# Patient Record
Sex: FEMALE | Race: WHITE | Employment: UNEMPLOYED | URBAN - METROPOLITAN AREA
[De-identification: names, ages, dates, MRNs, and addresses within clinical notes are randomized per-mention and may not be internally consistent; named-entity substitution may affect disease eponyms.]

---

## 2020-09-02 ENCOUNTER — ANESTHESIA EVENT (OUTPATIENT)
Dept: SURGERY | Age: 60
End: 2020-09-02
Payer: COMMERCIAL

## 2020-09-03 ENCOUNTER — HOSPITAL ENCOUNTER (OUTPATIENT)
Age: 60
Setting detail: OUTPATIENT SURGERY
Discharge: HOME | End: 2020-09-03
Attending: ORTHOPAEDIC SURGERY | Admitting: ORTHOPAEDIC SURGERY
Payer: COMMERCIAL

## 2020-09-03 ENCOUNTER — ANESTHESIA (OUTPATIENT)
Dept: SURGERY | Age: 60
End: 2020-09-03
Payer: COMMERCIAL

## 2020-09-03 VITALS
SYSTOLIC BLOOD PRESSURE: 116 MMHG | HEIGHT: 70 IN | BODY MASS INDEX: 23.62 KG/M2 | HEART RATE: 80 BPM | OXYGEN SATURATION: 93 % | WEIGHT: 165 LBS | RESPIRATION RATE: 16 BRPM | DIASTOLIC BLOOD PRESSURE: 63 MMHG | TEMPERATURE: 97.3 F

## 2020-09-03 DIAGNOSIS — L76.82 PAIN AT SURGICAL INCISION: Primary | ICD-10-CM

## 2020-09-03 PROCEDURE — 74011250636 HC RX REV CODE- 250/636: Performed by: ANESTHESIOLOGY

## 2020-09-03 PROCEDURE — 76060000032 HC ANESTHESIA 0.5 TO 1 HR: Performed by: ORTHOPAEDIC SURGERY

## 2020-09-03 PROCEDURE — 77030040361 HC SLV COMPR DVT MDII -B: Performed by: ORTHOPAEDIC SURGERY

## 2020-09-03 PROCEDURE — 74011250637 HC RX REV CODE- 250/637: Performed by: ANESTHESIOLOGY

## 2020-09-03 PROCEDURE — 76210000020 HC REC RM PH II FIRST 0.5 HR: Performed by: ORTHOPAEDIC SURGERY

## 2020-09-03 PROCEDURE — 77030003666 HC NDL SPINAL BD -A: Performed by: ORTHOPAEDIC SURGERY

## 2020-09-03 PROCEDURE — A4565 SLINGS: HCPCS | Performed by: ORTHOPAEDIC SURGERY

## 2020-09-03 PROCEDURE — 77030002916 HC SUT ETHLN J&J -A: Performed by: ORTHOPAEDIC SURGERY

## 2020-09-03 PROCEDURE — 76010010054 HC POST OP PAIN BLOCK: Performed by: ORTHOPAEDIC SURGERY

## 2020-09-03 PROCEDURE — 77030017964 HC PRB COAG4 STRY -C: Performed by: ORTHOPAEDIC SURGERY

## 2020-09-03 PROCEDURE — 74011250636 HC RX REV CODE- 250/636: Performed by: ORTHOPAEDIC SURGERY

## 2020-09-03 PROCEDURE — 77030019605: Performed by: ORTHOPAEDIC SURGERY

## 2020-09-03 PROCEDURE — 77030033005 HC TBNG ARTHSC PMP STRY -B: Performed by: ORTHOPAEDIC SURGERY

## 2020-09-03 PROCEDURE — 76942 ECHO GUIDE FOR BIOPSY: CPT | Performed by: ORTHOPAEDIC SURGERY

## 2020-09-03 PROCEDURE — 76010000160 HC OR TIME 0.5 TO 1 HR INTENSV-TIER 1: Performed by: ORTHOPAEDIC SURGERY

## 2020-09-03 PROCEDURE — 74011250636 HC RX REV CODE- 250/636: Performed by: NURSE ANESTHETIST, CERTIFIED REGISTERED

## 2020-09-03 PROCEDURE — 77030010509 HC AIRWY LMA MSK TELE -A: Performed by: ANESTHESIOLOGY

## 2020-09-03 PROCEDURE — 76210000006 HC OR PH I REC 0.5 TO 1 HR: Performed by: ORTHOPAEDIC SURGERY

## 2020-09-03 PROCEDURE — 77030006891 HC BLD SHV RESECT STRY -B: Performed by: ORTHOPAEDIC SURGERY

## 2020-09-03 PROCEDURE — 77030003602 HC NDL NRV BLK BBMI -B: Performed by: ANESTHESIOLOGY

## 2020-09-03 PROCEDURE — 74011000250 HC RX REV CODE- 250: Performed by: NURSE ANESTHETIST, CERTIFIED REGISTERED

## 2020-09-03 PROCEDURE — 77030006868 HC BLD SHV AUG STRY -B: Performed by: ORTHOPAEDIC SURGERY

## 2020-09-03 RX ORDER — ACETAMINOPHEN 500 MG
2000 TABLET ORAL 2 TIMES DAILY
COMMUNITY

## 2020-09-03 RX ORDER — HYDROMORPHONE HYDROCHLORIDE 2 MG/ML
0.5 INJECTION, SOLUTION INTRAMUSCULAR; INTRAVENOUS; SUBCUTANEOUS
Status: DISCONTINUED | OUTPATIENT
Start: 2020-09-03 | End: 2020-09-03 | Stop reason: HOSPADM

## 2020-09-03 RX ORDER — OXYCODONE AND ACETAMINOPHEN 10; 325 MG/1; MG/1
1 TABLET ORAL AS NEEDED
Status: DISCONTINUED | OUTPATIENT
Start: 2020-09-03 | End: 2020-09-03 | Stop reason: HOSPADM

## 2020-09-03 RX ORDER — DEXAMETHASONE SODIUM PHOSPHATE 4 MG/ML
INJECTION, SOLUTION INTRA-ARTICULAR; INTRALESIONAL; INTRAMUSCULAR; INTRAVENOUS; SOFT TISSUE AS NEEDED
Status: DISCONTINUED | OUTPATIENT
Start: 2020-09-03 | End: 2020-09-03 | Stop reason: HOSPADM

## 2020-09-03 RX ORDER — SODIUM CHLORIDE, SODIUM LACTATE, POTASSIUM CHLORIDE, CALCIUM CHLORIDE 600; 310; 30; 20 MG/100ML; MG/100ML; MG/100ML; MG/100ML
75 INJECTION, SOLUTION INTRAVENOUS CONTINUOUS
Status: DISCONTINUED | OUTPATIENT
Start: 2020-09-03 | End: 2020-09-03 | Stop reason: HOSPADM

## 2020-09-03 RX ORDER — LANOLIN ALCOHOL/MO/W.PET/CERES
1000 CREAM (GRAM) TOPICAL DAILY
COMMUNITY

## 2020-09-03 RX ORDER — FENTANYL CITRATE 50 UG/ML
100 INJECTION, SOLUTION INTRAMUSCULAR; INTRAVENOUS ONCE
Status: COMPLETED | OUTPATIENT
Start: 2020-09-03 | End: 2020-09-03

## 2020-09-03 RX ORDER — SODIUM CHLORIDE 0.9 % (FLUSH) 0.9 %
5-40 SYRINGE (ML) INJECTION EVERY 8 HOURS
Status: DISCONTINUED | OUTPATIENT
Start: 2020-09-03 | End: 2020-09-03 | Stop reason: HOSPADM

## 2020-09-03 RX ORDER — DEXAMETHASONE SODIUM PHOSPHATE 4 MG/ML
INJECTION, SOLUTION INTRA-ARTICULAR; INTRALESIONAL; INTRAMUSCULAR; INTRAVENOUS; SOFT TISSUE
Status: COMPLETED | OUTPATIENT
Start: 2020-09-03 | End: 2020-09-03

## 2020-09-03 RX ORDER — LIDOCAINE HYDROCHLORIDE 20 MG/ML
INJECTION, SOLUTION EPIDURAL; INFILTRATION; INTRACAUDAL; PERINEURAL AS NEEDED
Status: DISCONTINUED | OUTPATIENT
Start: 2020-09-03 | End: 2020-09-03 | Stop reason: HOSPADM

## 2020-09-03 RX ORDER — OXYCODONE HYDROCHLORIDE 5 MG/1
5 TABLET ORAL
Status: DISCONTINUED | OUTPATIENT
Start: 2020-09-03 | End: 2020-09-03 | Stop reason: HOSPADM

## 2020-09-03 RX ORDER — MIDAZOLAM HYDROCHLORIDE 1 MG/ML
2 INJECTION, SOLUTION INTRAMUSCULAR; INTRAVENOUS
Status: COMPLETED | OUTPATIENT
Start: 2020-09-03 | End: 2020-09-03

## 2020-09-03 RX ORDER — PROPOFOL 10 MG/ML
INJECTION, EMULSION INTRAVENOUS AS NEEDED
Status: DISCONTINUED | OUTPATIENT
Start: 2020-09-03 | End: 2020-09-03 | Stop reason: HOSPADM

## 2020-09-03 RX ORDER — ONDANSETRON 2 MG/ML
INJECTION INTRAMUSCULAR; INTRAVENOUS AS NEEDED
Status: DISCONTINUED | OUTPATIENT
Start: 2020-09-03 | End: 2020-09-03 | Stop reason: HOSPADM

## 2020-09-03 RX ORDER — EPINEPHRINE 1 MG/ML
INJECTION, SOLUTION, CONCENTRATE INTRAVENOUS AS NEEDED
Status: DISCONTINUED | OUTPATIENT
Start: 2020-09-03 | End: 2020-09-03 | Stop reason: HOSPADM

## 2020-09-03 RX ORDER — CEFAZOLIN SODIUM/WATER 2 G/20 ML
2 SYRINGE (ML) INTRAVENOUS ONCE
Status: COMPLETED | OUTPATIENT
Start: 2020-09-03 | End: 2020-09-03

## 2020-09-03 RX ORDER — ACETAMINOPHEN 500 MG
1000 TABLET ORAL ONCE
Status: COMPLETED | OUTPATIENT
Start: 2020-09-03 | End: 2020-09-03

## 2020-09-03 RX ORDER — EPHEDRINE SULFATE/0.9% NACL/PF 50 MG/5 ML
SYRINGE (ML) INTRAVENOUS AS NEEDED
Status: DISCONTINUED | OUTPATIENT
Start: 2020-09-03 | End: 2020-09-03 | Stop reason: HOSPADM

## 2020-09-03 RX ORDER — ROPIVACAINE HYDROCHLORIDE 5 MG/ML
INJECTION, SOLUTION EPIDURAL; INFILTRATION; PERINEURAL
Status: COMPLETED | OUTPATIENT
Start: 2020-09-03 | End: 2020-09-03

## 2020-09-03 RX ORDER — OXYCODONE AND ACETAMINOPHEN 5; 325 MG/1; MG/1
1 TABLET ORAL
Qty: 20 TAB | Refills: 0 | Status: SHIPPED | OUTPATIENT
Start: 2020-09-03 | End: 2020-09-08

## 2020-09-03 RX ORDER — SODIUM CHLORIDE 0.9 % (FLUSH) 0.9 %
5-40 SYRINGE (ML) INJECTION AS NEEDED
Status: DISCONTINUED | OUTPATIENT
Start: 2020-09-03 | End: 2020-09-03 | Stop reason: HOSPADM

## 2020-09-03 RX ADMIN — Medication 2 G: at 08:56

## 2020-09-03 RX ADMIN — ONDANSETRON 4 MG: 2 INJECTION INTRAMUSCULAR; INTRAVENOUS at 09:15

## 2020-09-03 RX ADMIN — FENTANYL CITRATE 100 MCG: 50 INJECTION, SOLUTION INTRAMUSCULAR; INTRAVENOUS at 07:45

## 2020-09-03 RX ADMIN — Medication 20 MG: at 09:15

## 2020-09-03 RX ADMIN — DEXAMETHASONE SODIUM PHOSPHATE 5 MG: 4 INJECTION, SOLUTION INTRAMUSCULAR; INTRAVENOUS at 07:48

## 2020-09-03 RX ADMIN — DEXAMETHASONE SODIUM PHOSPHATE 4 MG: 4 INJECTION, SOLUTION INTRAMUSCULAR; INTRAVENOUS at 09:15

## 2020-09-03 RX ADMIN — Medication 10 MG: at 09:22

## 2020-09-03 RX ADMIN — ACETAMINOPHEN 1000 MG: 500 TABLET, FILM COATED ORAL at 07:40

## 2020-09-03 RX ADMIN — Medication 10 MG: at 09:05

## 2020-09-03 RX ADMIN — LIDOCAINE HYDROCHLORIDE 100 MG: 20 INJECTION, SOLUTION EPIDURAL; INFILTRATION; INTRACAUDAL; PERINEURAL at 08:50

## 2020-09-03 RX ADMIN — Medication 10 MG: at 09:10

## 2020-09-03 RX ADMIN — PROPOFOL 50 MG: 10 INJECTION, EMULSION INTRAVENOUS at 08:55

## 2020-09-03 RX ADMIN — SODIUM CHLORIDE, SODIUM LACTATE, POTASSIUM CHLORIDE, AND CALCIUM CHLORIDE 75 ML/HR: 600; 310; 30; 20 INJECTION, SOLUTION INTRAVENOUS at 07:20

## 2020-09-03 RX ADMIN — MIDAZOLAM 2 MG: 1 INJECTION INTRAMUSCULAR; INTRAVENOUS at 07:45

## 2020-09-03 RX ADMIN — ROPIVACAINE HYDROCHLORIDE 20 ML: 150 INJECTION, SOLUTION EPIDURAL; INFILTRATION; PERINEURAL at 07:48

## 2020-09-03 RX ADMIN — PROPOFOL 150 MG: 10 INJECTION, EMULSION INTRAVENOUS at 08:50

## 2020-09-03 NOTE — H&P
Outpatient Surgery History and Physical:  El Flowers was seen and examined. CHIEF COMPLAINT:   Left shoulder releases and manipulation. PE:   There were no vitals taken for this visit. Heart:   Regular rhythm      Lungs:  Are clear      Past Medical History: There are no active problems to display for this patient. Surgical History:   Past Surgical History:   Procedure Laterality Date    HX APPENDECTOMY  1986    HX BREAST LUMPECTOMY Right 1999    benign    HX COLONOSCOPY  06/2020    HX ORTHOPAEDIC Right 2013    RIGHT ACHILLES SECONDARY RECONSTRUCTION/REPAIR [facility] BIG TOE TENDON TRANSFER        Social History: Patient  reports that she has never smoked. She has never used smokeless tobacco. She reports current alcohol use of about 7.0 - 14.0 standard drinks of alcohol per week. She reports that she does not use drugs. Family History:   Family History   Problem Relation Age of Onset    Thyroid Disease Mother     Lung Disease Father     Heart Disease Brother        Allergies: Reviewed per EMR  Allergies   Allergen Reactions    Latex Swelling     With rubber bands on braces       Medications:    No current facility-administered medications on file prior to encounter. No current outpatient medications on file prior to encounter. The surgery is planned for the left shoulder. The patient is here today for outpatient surgery. I have examined the patient, no changes are noted in the patient's medical status. Necessity for the procedure/care is still present and the history and physical above is current. See the office notes for the full long term history of the problem. Please see the recent office notes for the musculoskeletal examination. The risks and the benefits of the surgery have been discussed.     Signed By: Padma Gary MD     September 3, 2020 6:49 AM

## 2020-09-03 NOTE — ANESTHESIA PROCEDURE NOTES
Peripheral Block    Start time: 9/3/2020 7:45 AM  End time: 9/3/2020 7:48 AM  Performed by: Judy Lucas MD  Authorized by: Judy Lucas MD       Pre-procedure: Indications: at surgeon's request and post-op pain management    Preanesthetic Checklist: patient identified, risks and benefits discussed, site marked, timeout performed, anesthesia consent given and patient being monitored    Timeout Time: 07:45          Block Type:   Block Type:   Interscalene  Laterality:  Left  Monitoring:  Standard ASA monitoring, continuous pulse ox, frequent vital sign checks, heart rate, oxygen and responsive to questions  Injection Technique:  Single shot  Procedures: ultrasound guided and nerve stimulator    Patient Position: supine  Prep: chlorhexidine    Location:  Interscalene  Needle Type:  Stimuplex  Needle Gauge:  20 G  Needle Localization:  Anatomical landmarks, nerve stimulator and ultrasound guidance    Assessment:  Number of attempts:  1  Injection Assessment:  Incremental injection every 5 mL, local visualized surrounding nerve on ultrasound, negative aspiration for blood, no intravascular symptoms, no paresthesia and ultrasound image on chart  Patient tolerance:  Patient tolerated the procedure well with no immediate complications

## 2020-09-03 NOTE — BRIEF OP NOTE
Brief Postoperative Note    Patient: Bhanu Holcomb  YOB: 1960  MRN: [id number]    Date of Procedure: 9/3/2020     Pre-Op Diagnosis: Strain of muscle(s) and tendon(s) of the rotator cuff of left shoulder, initial encounter [S46.012A]  Adhesive capsulitis of left shoulder [M75.02]    Post-Op Diagnosis: Same as preoperative diagnosis.       Procedure(s):  LEFT SHOULDER ARTHROSCOPY WITH SUBACROMIAL DECOMPRESSION AND RELEASE OF ADHESIONS  LEFT SHOULDER RELEASE ADHESIONS W/ MANIPULATION GEN/ SCAL    Surgeon(s):  Ely Jones MD    Surgical Assistant: None    Anesthesia: General     Estimated Blood Loss (mL): Minimal    Complications: None    Specimens: * No specimens in log *     Implants: * No implants in log *    Drains: * No LDAs found *    Findings:     Electronically Signed by Alo Montiel MD on 9/3/2020 at 9:36 AM

## 2020-09-03 NOTE — OP NOTES
PATIENT:    Juan R Workman      DATE OF SURGERY:  9/3/2020      PREOPERATIVE  DIAGNOSIS:  Strain of muscle(s) and tendon(s) of the rotator cuff of left shoulder, initial encounter [S46.012A]  Adhesive capsulitis of left shoulder [M75.02]      POSTOPERATIVE DIAGNOSIS:   Strain of muscle(s) and tendon(s) of the rotator cuff of left shoulder,       PROCEDURE:   Procedure(s):  LEFT SHOULDER ARTHROSCOPY WITH SUBACROMIAL DECOMPRESSION AND RELEASE OF ADHESIONS  LEFT SHOULDER RELEASE ADHESIONS W/ MANIPULATION GEN/ SCAL      PROCEDURE IN DETAIL:   The patient's left   shoulder  was prepped and draped in a sterile fashion. There was severe restriction of motion. The arthroscope was inserted through a posterior portal  and the interior of the joint was examined. The labrum was intact. The biceps tendon was normal as well. The articular surfaces were normal. There was not a visible tear of the rotator cuff. The scope was then removed from the shoulder joint and then placed into the subacromial space. The bursal tissue was removed and visualization was obtained. The tissue was thick and tight. It was released and removed. The anterior acromion was prominent. A subacromial decompression was performed through the posterior portal using a cutting block technique. This provided a good decompression of the subacromial space. The rotator cuff was examined and a small partial shallow tear was seen. This was debrided to healthy tissue. It was very small and shallow. The distal clavicle was OK. A manipulation was next done and full motion was obtained in all directions. The stab wounds were then closed in the standard fashion. A sterile dressing was applied. A sling was placed as well. The patient was then taken to the recovery room in satisfactory condition.           Anabella Berkowitz M.D.

## 2020-09-03 NOTE — ANESTHESIA PREPROCEDURE EVALUATION
Relevant Problems   No relevant active problems       Anesthetic History   No history of anesthetic complications            Review of Systems / Medical History  Patient summary reviewed and pertinent labs reviewed    Pulmonary  Within defined limits                 Neuro/Psych   Within defined limits           Cardiovascular                  Exercise tolerance: >4 METS     GI/Hepatic/Renal  Within defined limits              Endo/Other      Hypothyroidism       Other Findings   Comments: Daily ETOH         Physical Exam    Airway  Mallampati: II  TM Distance: > 6 cm  Neck ROM: normal range of motion   Mouth opening: Normal     Cardiovascular    Rhythm: regular           Dental    Dentition: Caps/crowns     Pulmonary                 Abdominal  GI exam deferred       Other Findings            Anesthetic Plan    ASA: 2  Anesthesia type: general - interscalene block      Post-op pain plan if not by surgeon: peripheral nerve block single    Induction: Intravenous  Anesthetic plan and risks discussed with: Patient

## 2020-09-03 NOTE — DISCHARGE INSTRUCTIONS
Post-Operative Instructions   For   [facility]  Shoulder Arthroscopy  Phone:  [phone]    1. Apply ice to the shoulder as needed. 2.   Keep dressings in place. We will remove it at the office. 3. You may discontinue use of your sling and begin active range of motion of the shoulder. 4. You may shower with the dressing on. Wait until tomorrow however. No lake. 5. Use  pain medication as instructed on the bottle. If you have pain medication from another physcian do not use it with this medication. 6. You may have some side effects from your pain medication. If you have nausea, try taking your medication with food. For itching, you may take over the counter Benadryl. 7. If you have a problem, please call [address] [place] [address] at [phone]    Alea Crowell M.D. [employer] ACTIVITY  · As tolerated and as directed by your doctor. · Bathe or shower as directed by your doctor. DIET  · Clear liquids until no nausea or vomiting; then light diet for the first day. · Advance to regular diet on second day, unless your doctor orders otherwise. · If nausea and vomiting continues, call your doctor. PAIN  · Take pain medication as directed by your doctor. · Call your doctor if pain is NOT relieved by medication. · DO NOT take aspirin of blood thinners unless directed by your doctor. CALL YOUR DOCTOR IF   · Excessive bleeding that does not stop after holding pressure over the area  · Temperature of 101 degrees F or above  · Excessive redness, swelling or bruising, and/ or green or yellow, smelly discharge from incision    AFTER ANESTHESIA   · For the first 24 hours: DO NOT Drive, Drink alcoholic beverages, or Make important decisions. · Be aware of dizziness following anesthesia and while taking pain medication.          DISCHARGE SUMMARY from Nurse    PATIENT INSTRUCTIONS:    After general anesthesia or intravenous sedation, for 24 hours or while taking prescription Narcotics:  · Limit your activities  · Do not drive and operate hazardous machinery  · Do not make important personal or business decisions  · Do  not drink alcoholic beverages  · If you have not urinated within 8 hours after discharge, please contact your surgeon on call. *  Please give a list of your current medications to your Primary Care Provider. *  Please update this list whenever your medications are discontinued, doses are      changed, or new medications (including over-the-counter products) are added. *  Please carry medication information at all times in case of emergency situations. These are general instructions for a healthy lifestyle:    No smoking/ No tobacco products/ Avoid exposure to second hand smoke    Surgeon General's Warning:  Quitting smoking now greatly reduces serious risk to your health. Obesity, smoking, and sedentary lifestyle greatly increases your risk for illness    A healthy diet, regular physical exercise & weight monitoring are important for maintaining a healthy lifestyle    You may be retaining fluid if you have a history of heart failure or if you experience any of the following symptoms:  Weight gain of 3 pounds or more overnight or 5 pounds in a week, increased swelling in our hands or feet or shortness of breath while lying flat in bed. Please call your doctor as soon as you notice any of these symptoms; do not wait until your next office visit. Recognize signs and symptoms of STROKE:    F-face looks uneven    A-arms unable to move or move unevenly    S-speech slurred or non-existent    T-time-call 911 as soon as signs and symptoms begin-DO NOT go       Back to bed or wait to see if you get better-TIME IS BRAIN. What to Expect After a Nerve Block  Nerve blocks affect many types of nerves. The affected nerves control movement, pain, and normal sensation.  This causes feelings such as: · Weakness  · Numbness  · Tingling  · Heaviness  · A feeling that your arm or leg has \"fallen asleep\"    A nerve block can last for 24-48 hours, depending on the medications used. Usually the weakness wears off first, and then you feel a numb/tingly sensation. Finally, the pain may come back. This can happen in any order. If you had a shoulder block, you may have other symptoms such as:    · Mild shortness of breath  · A hoarse voice  · Blurry vision  · Unequal pupils  · Drooping of your face on the same side as the nerve block    These are common and expected side effects of this type of nerve block. Symptoms usually go away within 12 hours. If these symptoms do not go away, please call the Anesthesiology Department at [phone] and ask for Anesthesiologist on call. If you have severe or prolonged shortness of breath, please go to the nearest emergency room. If you continue to feel the effects of the nerve block for longer than 48 hours, please call the Anesthesiology Department at [phone]. Pain Medication  If needed, your surgeon will give you a prescription for pain medication. Nerve blocks sometimes wear off during the night. It is a good idea to take your pain medicine before going to sleep so you won't wake up with pain. The idea is to have pain medicine in your body before the nerve block wears off. To help prevent nausea, eat something before taking the pain medicine. Once a nerve block starts to wear off, it is usually completely gone within 60 minutes. It is important to have pain medicine in your system before the block wears off completely. Helpful Tips to Protect the Part of Your Body That Is Numb  After a nerve block, you cannot feel pain, pressure, or extremes in temperature. Because your arm or leg is numb, it is more at risk for injury. For example, if working near a hot oven, you could burn your arm or leg without knowing it.        Cont'd  Helpful Tips to Protect the Part of Your Body That Is Numb    Here are some hints to help protect your limb while it is numb:    · While you are awake, try to change positions of your arm or leg often. This will help you avoid putting too much pressure on the limb for long periods of time. · While sleeping, pad the blocked limb with pillows to avoid placing too much pressure on the limb. · If you have a cast or a tight dressing, check the color of your finger/toes every couple hours. Call your doctor if they look discolored. · If you had a shoulder, arm, or hand nerve block, you may go home with a sling. The sling will help to keep your arm in the ideal position. Wear the sling at all times until feeling returns. If you do not have a sling, watch the position of the blocked arm to make sure it is in a safe location. · If you had a leg or foot block, walk with crutches and assistance until the block wears off completely. Bearing weight on a partially numb leg may result in a fall and further injury. · Ask your family or support people to help with the above hints. Learning About COVID-19 and Social Distancing  What is it? Social distancing means putting space between yourself and other people. The recommended distance is 6 feet, or about 2 meters. This also means staying away from any place where people may gather, such as [place] or other public gathering places. Why is it important? Social distancing is the best way to reduce the spread of COVID-19. This virus seems to spread from person to person through droplets from coughing and sneezing. So if you keep your distance from others, you're less likely to get it or spread it. And social distancing is important for everyone, not just those who are at high risk of infection, like older people. You might have the virus but not have symptoms. You could then give the infection to someone you come into contact with. How is it done?   Putting 6 feet, or about 2 meters, between you and other people is the recommended distance. Also stay away from any place where people may gather, such as [place] or other public gathering places. So if possible:  Work from home, and keep your kids at home. Don't travel if you don't have to. And avoid public transportation, ride-shares, and taxis unless you have no choice. Limit shopping to essentials, like food and medicines. Wear a cloth face cover if you have to go to a public place like the grocery store or pharmacy. Don't eat in restaurants. (You can still get takeout or food deliveries.)  Avoid crowds and busy places. Follow stay-at-home orders or other directions for your area. Current as of: May 8, 2020               Content Version: 12.5  © 2006-2020 [employer]. Care instructions adapted under license by [employer] (which disclaims liability or warranty for this information). If you have questions about a medical condition or this instruction, always ask your healthcare professional. [address] any warranty or liability for your use of this information.

## 2020-09-14 NOTE — ANESTHESIA POSTPROCEDURE EVALUATION
Procedure(s):  LEFT SHOULDER ARTHROSCOPY WITH SUBACROMIAL DECOMPRESSION AND RELEASE OF ADHESIONS  LEFT SHOULDER RELEASE ADHESIONS W/ MANIPULATION GEN/ SCAL.     general    Anesthesia Post Evaluation      Multimodal analgesia: multimodal analgesia used between 6 hours prior to anesthesia start to PACU discharge  Patient location during evaluation: PACU  Patient participation: complete - patient participated  Level of consciousness: awake  Pain management: adequate  Airway patency: patent  Anesthetic complications: no  Cardiovascular status: acceptable  Respiratory status: acceptable  Hydration status: acceptable  Post anesthesia nausea and vomiting:  none  Final Post Anesthesia Temperature Assessment:  Normothermia (36.0-37.5 degrees C)      INITIAL Post-op Vital signs:   Vitals Value Taken Time   /63 9/3/2020 10:07 AM   Temp 36.3 °C (97.3 °F) 9/3/2020  9:39 AM   Pulse 80 9/3/2020 10:07 AM   Resp 16 9/3/2020 10:07 AM   SpO2 93 % 9/3/2020 10:07 AM

## 2022-10-07 ENCOUNTER — OFFICE VISIT (OUTPATIENT)
Dept: ORTHOPEDIC SURGERY | Age: 62
End: 2022-10-07
Payer: COMMERCIAL

## 2022-10-07 DIAGNOSIS — M20.5X2 ACQUIRED CLAW TOE OF LEFT FOOT: ICD-10-CM

## 2022-10-07 DIAGNOSIS — M77.42 METATARSALGIA OF LEFT FOOT: ICD-10-CM

## 2022-10-07 DIAGNOSIS — M79.672 LEFT FOOT PAIN: Primary | ICD-10-CM

## 2022-10-07 DIAGNOSIS — M21.619 BUNION: ICD-10-CM

## 2022-10-07 PROCEDURE — A9999 DME SUPPLY OR ACCESSORY, NOS: HCPCS | Performed by: ORTHOPAEDIC SURGERY

## 2022-10-07 PROCEDURE — 99214 OFFICE O/P EST MOD 30 MIN: CPT | Performed by: ORTHOPAEDIC SURGERY

## 2022-10-07 RX ORDER — MELOXICAM 15 MG/1
15 TABLET ORAL DAILY
Qty: 30 TABLET | Refills: 2 | Status: SHIPPED | OUTPATIENT
Start: 2022-10-07

## 2022-10-07 NOTE — PROGRESS NOTES
Name: Justino Silverio  YOB: 1960  Gender: female  MRN: [id number]     CC: Left foot pain    HPI:   10/30/2013: Right Achilles debridement/reconstruction, [facility] tendon transfer   ~ 2017 she developed left lateral foot pain. 08/25/2017: Diagnosis: Left dorsolateral foot pain, talonavicular narrowing, 3-4 tarsometatarsal area arthritis   11/15/2021: Diagnoses:   Left lateral hindfoot pain felt to be subtalar to calcaneocuboid joint driven [injected]   Left tarsometatarsal arthritis    July 2022, she noticed left second toe and forefoot pain  September 2022: Increased amount of forefoot pain and limitations  10/07/2022: She presents with left > right second toe changes and left forefoot pain    ROS/Meds/PSH/PMH/FH/SH: reviewed today    Tobacco:  reports that she has never smoked. She has never used smokeless tobacco.     Physical Examination:  Patient appears to be alert and oriented with acceptable appearance. No obvious distress or SOB  CV: appears to have acceptable vascular color and capillary refill  Neuro: appears to have mostly intact light touch sensation   Skin: No soft tissue swelling but thickening second MTP left  MS: Standing: Mild bunions: L>R tibial second claw toes  Left = second MTP joint pain/volar plate pain; no interdigital nerve pain  Left = bunion reducible; good ankle/foot motion; 5/5 strength    XR: Left side: Standing AP lateral oblique foot taken today with long second metatarsal Bolivar's foot; no gross AVN; bunion and adducted second toe; hindfoot and midfoot arthritis;  XR Impression:  As above      Injection: Not applicable today    Assessment:    Right mild bunion; mild tibial second claw toe  Left bunion; tibial second claw toe, metatarsal phalangeal joint synovitis, metatarsalgia  Left tarsometatarsal arthritis; hindfoot arthritis    Plan:   The patient and I discussed the above assessment. We explored treatment options.      She has bunions and tibial second claw toes, but her main issue relates to left second metatarsal phalangeal joint synovitis and metatarsalgia. We discussed the possibility of volar plate tear but she has no gross instability   Advanced medical imaging: Left foot MRI scan: No indication today and uncertain ability to do MRI scan with pacemaker    DME: Dual action toe spacers  We discussed toe care and spacer protection  PT: Instead of formal PT, she will begin HEP program as outlined today for Achilles and MTP stretching  Orthotic/prosthetic: No custom insoles at this time  I recommend for exercise [employer] shoes but otherwise I recommend [employer] recovery sandals/shoes:  Handout issued for transferable pants and ball of foot pad    Medication - OTC meds prn: Prescribed [once she is off her Plavix]: Mobic 15 mg: #30: 1 p.o. daily  Surgical discussion: Future surgical consideration: Left bunionectomy; second claw toe resection  Follow up: As needed/as discussed  Work status: Regular    This note was created using Dragon voice recognition software which may result in errors of speech and spelling recognition and word/phrase syntax errors.

## 2022-10-07 NOTE — LETTER
DME Patient Authorization Form    Name: Sheridan [birth year]  : 1960  MRN: [id number]   Age: 57 y.o. Gender: female  Delivery Address: [facility]     Diagnosis:     ICD-10-CM    1. Left foot pain  M79.672 XR FOOT LEFT (MIN 3 VIEWS)     Dual Action Toe Spacer/Bunion Guard ()           Requested DME:  DualAction Toe Spacer/Bunion Guard** - -81 ($10.00) X 2 - bilateral        Clinical Notes:     **Indicates non-covered items by insurance. Payment expected on date of service. Electronically signed by  Provider: Refugio Krishnamurthy MD__Date: 10/7/2022                            [facility] [address] [place] [address] Tax ID # [id number]        Durable Medical Equipment and/or Orthotics Patient Consent     I understand that my physician has prescribed this medical supply as part of my treatment plan as a matter of Medical Necessity.  I understand that I have a choice in where I receive my prescribed orthopedic supplies and/or services.  I authorize [facility] to furnish this service/product and to provide my insurance carrier with any information requested in order to process for payment.  I instruct my insurance carrier to pay [facility] directly for these services/products.  I understand that my insurance carrier may deny payment for this supply because it is a non-covered item, deemed not medically necessary or considered experimental.   I understand that any cost not covered by my insurance carrier will be solely my financial responsibility.  I have received the Supplier Standards and have reviewed them.  I have received the prescribed item and have been fully instructed on the proper use of the above services/products.    ______ (Patient Initials) I understand that all DME items are non-returnable after being dispensed.  Items still in sealed packaging may be returned up to 14 days after service. You have the right to talk in confidence with health care providers and to have your health care information protected. You have the right to receive an explanation of your bill. You have the right to complain about the service or product you receive. Patient Responsibilities:  Please provide complete and accurate information about your health insurance benefits and make arrangements for the timely payment of your bill. [facility] will, if possible, assume responsibility for billing your insurance (Medicare, Medicaid and commercial) for the prescribed equipment or devices. If your policy does not cover the prescribed product, or only covers a portion of the bill, you are responsible for any remaining balance. Return and Exchange Policy:  [facility] will honor published  Warranties for products. [facility] will accept returns or exchanges within 14 days from the date of receipt, providin) the product must be in new condition; 2) receipt as required; and 3) used disposable and hygiene products may only be returned due to a defective product. Note: Refunds will be issued in a timely manner, please allow 4-6 weeks for processing. Complaint Procedures and DME Consumer Protection Resources:  [facility] values you as a customer, and is committed to resolving patient concerns. This commitment includes understanding and documenting your concerns, conducting a review of internal procedures, and providing you with an explanation and resolution to your concerns. Should you have any questions about our services or billing process, please contact our office at (practice phone number). If we are unable to resolve the concern, you have the right to direct comments to the office of Consumer Protection, in the [address] or the [facility] office, without fear of repercussion.     DMEPOS SUPPLIER STANDARDS    A supplier must be in compliance with all applicable Federal and State licensure and regulatory requirements. A supplier must provide complete and accurate information on the DMEPOS supplier application. Any changes to this information must be reported to the [facility] within 30 days. An authorized individual (one whose signature is binding) must sign the application for billing privileges. A supplier must fill orders from its own inventory, or must contract with other companies for the purchase of items necessary to fill the order. A supplier may not contract with any entity that is currently excluded from the Medicare program, any [facility] program, or from any other Federal procurement or Nonprocurement programs. A supplier must advise beneficiaries that they may rent or purchase inexpensive or routinely purchased durable medical equipment, and of the purchase option for capped rental equipment. A supplier must notify beneficiaries of warranty coverage and honor all warranties under applicable State Law, and repair or replace free of charge Medicare covered items that are under warranty. A supplier must maintain a physical facility on an appropriate site. A supplier must permit CMS, or its agents to conduct on-site inspections to ascertain the supplier's compliance with these standards. The supplier location must be accessible to beneficiaries during reasonable business hours, and must maintain a visible sign and posted hours of operation. A supplier must maintain a primary business telephone listed under the name of the business in a [employer] or a toll free number available through directory assistance. The exclusive use of a beeper, answering machine or cell phone is prohibited. A supplier must have comprehensive liability insurance in the amount of at least $300,000 that covers both the supplier's place of business and all customers and employees of the supplier.   If the supplier manufactures its own items, this insurance must also cover product liability and completed operations. A supplier must agree not to initiate telephone contact with beneficiaries, with a few exceptions allowed. This standard prohibits suppliers from calling beneficiaries in order to solicit new business. A supplier is responsible for delivery and must instruct beneficiaries on use of Medicare covered items, and maintain proof of delivery. A supplier must answer questions, and respond to complaints of the beneficiaries, and maintain documentation of such contacts. A supplier must maintain and replace at no charge or repair directly, or through a service contract with another company, Medicare covered items it has rented to beneficiaries. A supplier must accept returns of substandard (less than full quality for the particular item) or unsuitable items (inappropriate for the beneficiary at the time it was fitted and rented or sold) from beneficiaries. A supplier must disclose these supplier standards to each beneficiary to whom it supplies a Medicare-covered item. A supplier must disclose to the government any person having ownership, financial, or control interest in the supplier. A supplier must not convey or reassign a supplier number; i.e., the supplier may not sell or allow another entity to use its Medicare billing number. A supplier must have a complaint resolution protocol established to address beneficiary complaints that relate to these standards. A record of these complaints must be maintained at the physical facility. Complaint records must include: the name, address, telephone number and health insurance claim number of the beneficiary, a summary of the complaint, and any action taken to resolve it. A supplier must agree to furnish CMS any information required by the Medicare statute and implementing regulations.   A supplier of DMEPOS and other items and services must be accredited by a CMS-approved accreditation organization in order

## 2022-10-07 NOTE — PROGRESS NOTES
The patient was prescribed and fitted with bilateral DualAction toe spacer/bunion guard. Patient read and signed documenting they understand and agree to [facility]'s current DME return policy.

## 2023-02-28 ENCOUNTER — OFFICE VISIT (OUTPATIENT)
Dept: ORTHOPEDIC SURGERY | Age: 63
End: 2023-02-28

## 2023-02-28 VITALS — HEIGHT: 70 IN | BODY MASS INDEX: 25.77 KG/M2 | WEIGHT: 180 LBS

## 2023-02-28 DIAGNOSIS — M79.671 PAIN OF RIGHT HEEL: Primary | ICD-10-CM

## 2023-02-28 RX ORDER — PREDNISONE 5 MG/1
TABLET ORAL
Qty: 1 EACH | Refills: 2 | Status: SHIPPED | OUTPATIENT
Start: 2023-02-28

## 2023-02-28 NOTE — PROGRESS NOTES
Patient was fitted and instructed on an [address] [place] [address] for the right foot. Patient read and signed documenting they understand and agree to [facility]'s current DME return policy.

## 2023-02-28 NOTE — PROGRESS NOTES
Name: Makayla Land  YOB: 1960  Gender: female  MRN: [id number]     CC: Right heel pain    HPI:   10/30/2013: Right Achilles debridement/reconstruction, FHL tendon transfer   ~ 2017 she developed left lateral foot pain. 08/25/2017: Diagnosis: Left dorsolateral foot pain, talonavicular narrowing, 3-4 tarsometatarsal area arthritis   11/15/2021: Diagnoses:   Left lateral hindfoot pain felt to be subtalar to calcaneocuboid joint driven [injected]   Left tarsometatarsal arthritis    July 2022, she noticed left second toe and forefoot pain  September 2022: Increased amount of forefoot pain and limitations  10/07/2022: She presented: Left > Right second toe changes and left forefoot pain  10/07/2022: Diagnoses:  Right mild bunion; mild tibial second claw toe  Left bunion; tibial second claw toe, metatarsal phalangeal joint synovitis, metatarsalgia  Left tarsometatarsal arthritis; hindfoot arthritis    02/27/2023: Played singles tennis which is not her normal activity. 02/28/2023: Woke up with intense severe heel pain    ROS/Meds/PSH/PMH/FH/SH: reviewed today    Tobacco:  reports that she has never smoked. She has never used smokeless tobacco.     Physical Examination:  Patient appears to be alert and oriented with acceptable appearance.   No obvious distress or SOB  CV: appears to have acceptable vascular color and capillary refill  Neuro: appears to have mostly intact light touch sensation   Skin: Right heel soft tissue swelling  MS: Standing: Plantigrade: Gait limited  Right = global posterior heel tuberosity pain extending into the plantar central heel  Right = no plantar fascial tear; no Achilles tear    XR: Right: Standing AP lateral mortise ankle plus AP oblique foot taken today with no acute pathology appreciated; moderate midfoot arthritis  XR Impression:  As above      Assessment:    Right acute heel pain: Suspected heel tuberosity stress fracture    Plan:   The patient and I discussed the above assessment. We explored treatment options. She could have a combination of acute plantar fasciitis and insertional Achilles tendinitis, but I suspect more likely heel tuberosity stress fracture  Plan is immobilization, medication and reevaluation    Advanced medical imaging: Right ankle/hindfoot MRI scan: Potential future  DME: She has a 3D boot: Issued Air heel to wear with a 3D boot  We discussed heel care and protection  PT: No indication  Orthotic/prosthetic: Potential future       Medication - OTC meds prn: Prescribed: Prednisone 5 mg Sterapred pack; take per package insert; 48; 2 refills  Surgical discussion: Future consideration for surgery but no indication today  Follow up: 1 week  Work status: Sitting     This note was created using Dragon voice recognition software which may result in errors of speech and spelling recognition and word/phrase syntax errors.

## 2023-02-28 NOTE — LETTER
DME Patient Authorization Form    Name: Gillian Ordonez  : 1960  MRN: [id number]   Age: 62 y.o.  Gender: female  Delivery Address: [facility]     Diagnosis:     ICD-10-CM    1. Pain of right heel  M79.671 Aircast Air Heel ()           Requested DME:  Aircast Air Heel -  ($35.00) X 1 - right        Clinical Notes:     **Indicates non-covered items by insurance. Payment expected on date of service.      Electronically signed by  Provider: JESUS AVILA MD__Date: 2023                            [facility] Tax ID # [id number]        Durable Medical Equipment and/or Orthotics Patient Consent    • I understand that my physician has prescribed this medical supply as part of my treatment plan as a matter of Medical Necessity.  • I understand that I have a choice in where I receive my prescribed orthopedic supplies and/or services.  • I authorize [facility] to furnish this service/product and to provide my insurance carrier with any information requested in order to process for payment.  • I instruct my insurance carrier to pay [facility] directly for these services/products.  • I understand that my insurance carrier may deny payment for this supply because it is a non-covered item, deemed not medically necessary or considered experimental.  • I understand that any cost not covered by my insurance carrier will be solely my financial responsibility.  • I have received the Supplier Standards and have reviewed them.  • I have received the prescribed item and have been fully instructed on the proper use of the above services/products.    ______ (Patient Initials) I understand that all DME items are non-returnable after being dispensed. Items still in sealed packaging may be returned up to 14 days after purchasing. [facility] will replace  items that are defective.    ______ (Patient Initials) I understand that Valerie Ortega will not file a claim with my insurance carrier for this service/product and I am waiving my right to file a claim on my own for this service/product with my insurance company as this item is NON-COVERED (Denoted by the **) by my Insurance company/policy. ______ (Patient Initials) I understand that I am responsible to bring my equipment to the hospital for any surgery. ______________________________________________  ________________________  Patient / Raj Chacon            Thank you for considering [address] Your physician has prescribed specific medical equipment or devices for your home use. The following describes your rights and responsibilities as our customer. Right to Choose Providers: You have a choice regarding which company supplies your home medical equipment and devices, and to consult your physician in this decision. You may choose a medical supply store, a home medical equipment provider, or a specialist such as [facility]. [facility] will coordinate with your physician to provide the medical equipment or devices prescribed for your home use. Right to Service:  You have the right to considerate, respectful and nondiscriminatory care. You have the right to receive accurate and easily understood information about your health care. If you speak a foreign language, or don't understand the discussions, assistance will be provided to allow you to make informed health care decisions. You have the right to know your treatment options and to participate in decisions about your care, including the right to accept or refuse treatment. You have the right to expect a reasonable response to your requests for treatment or service.   You have the right to talk in confidence with health care providers and to have your health care information protected. You have the right to receive an explanation of your bill. You have the right to complain about the service or product you receive. Patient Responsibilities:  Please provide complete and accurate information about your health insurance benefits and make arrangements for the timely payment of your bill. [facility] will, if possible, assume responsibility for billing your insurance (Medicare, Medicaid and commercial) for the prescribed equipment or devices. If your policy does not cover the prescribed product, or only covers a portion of the bill, you are responsible for any remaining balance. Return and Exchange Policy:  [facility] will honor published  Warranties for products. [facility] will accept returns or exchanges within 14 days from the date of receipt, providin) the product must be in new condition; 2) receipt as required; and 3) used disposable and hygiene products may only be returned due to a defective product. Note: Refunds will be issued in a timely manner, please allow 4-6 weeks for processing. Complaint Procedures and DME Consumer Protection Resources:  [facility] values you as a customer, and is committed to resolving patient concerns. This commitment includes understanding and documenting your concerns, conducting a review of internal procedures, and providing you with an explanation and resolution to your concerns. Should you have any questions about our services or billing process, please contact our office at (practice phone number). If we are unable to resolve the concern, you have the right to direct comments to the office of Consumer Protection, in the [address] or the [facility] office, without fear of repercussion. DMEPOS SUPPLIER STANDARDS    A supplier must be in compliance with all applicable Federal and [employer] and regulatory requirements.   A supplier must provide complete and accurate information on the DMEPOS supplier application. Any changes to this information must be reported to the [facility] within 30 days. An authorized individual (one whose signature is binding) must sign the application for billing privileges. A supplier must fill orders from its own inventory, or must contract with other companies for the purchase of items necessary to fill the order. A supplier may not contract with any entity that is currently excluded from the Medicare program, any [facility] [place] program, or from any other Federal procurement or Nonprocurement programs. A supplier must advise beneficiaries that they may rent or purchase inexpensive or routinely purchased durable medical equipment, and of the purchase option for capped rental equipment. A supplier must notify beneficiaries of warranty coverage and honor all warranties under applicable State Law, and repair or replace free of charge Medicare covered items that are under warranty. A supplier must maintain a physical facility on an appropriate site. A supplier must permit CMS, or its agents to conduct on-site inspections to ascertain the supplier's compliance with these standards. The supplier location must be accessible to beneficiaries during reasonable business hours, and must maintain a visible sign and posted hours of operation. A supplier must maintain a primary business telephone listed under the name of the business in a [employer] or a toll free number available through directory assistance. The exclusive use of a beeper, answering machine or cell phone is prohibited. A supplier must have comprehensive liability insurance in the amount of at least $300,000 that covers both the supplier's place of business and all customers and employees of the supplier. If the supplier manufactures its own items, this insurance must also cover product liability and completed operations.   A supplier must agree not to initiate telephone contact with beneficiaries, with a few exceptions allowed. This standard prohibits suppliers from calling beneficiaries in order to solicit new business. A supplier is responsible for delivery and must instruct beneficiaries on use of Medicare covered items, and maintain proof of delivery. A supplier must answer questions, and respond to complaints of the beneficiaries, and maintain documentation of such contacts. A supplier must maintain and replace at no charge or repair directly, or through a service contract with another company, Medicare covered items it has rented to beneficiaries. A supplier must accept returns of substandard (less than full quality for the particular item) or unsuitable items (inappropriate for the beneficiary at the time it was fitted and rented or sold) from beneficiaries. A supplier must disclose these supplier standards to each beneficiary to whom it supplies a Medicare-covered item. A supplier must disclose to the government any person having ownership, financial, or control interest in the supplier. A supplier must not convey or reassign a supplier number; i.e., the supplier may not sell or allow another entity to use its Medicare billing number. A supplier must have a complaint resolution protocol established to address beneficiary complaints that relate to these standards. A record of these complaints must be maintained at the physical facility. Complaint records must include: the name, address, telephone number and health insurance claim number of the beneficiary, a summary of the complaint, and any action taken to resolve it. A supplier must agree to furnish CMS any information required by the Medicare statute and implementing regulations. A supplier of DMEPOS and other items and services must be accredited by a CMS-approved accreditation organization in order to receive and retain a supplier billing number.  The accreditation must indicate the specific products and services, for which the supplier is accredited in order for the supplier to receive payment for those specific products and services. A DMEPOS supplier must notify their accreditation organization when a new DMEPOS location is opened. The accreditation organization may accredit the new supplier location for three months after it is operational without requiring a new site visit. All DMEPOS supplier locations, whether owned or subcontracted, must meet the [employer] and be separately accredited in order to bill Medicare. An accredited supplier may be denied enrollment or their enrollment may be revoked, if CMS determines that they are not in compliance with the DMEPOS quality standards. A DMEPOS supplier must disclose upon enrollment all products and services, including the addition of new product lines for which they are seeking accreditation. If a new product line is added after enrollment, the DMEPOS supplier will be responsible for notifying the accrediting body of the new product so that the DMEPOS supplier can be re-surveyed and accredited for these new products. Must meet the surety bond requirements specified in 42 C. F.R. 424.57(c). Implementation date- May 4, 2009. A supplier must obtain oxygen from a state-licensed oxygen supplier. A supplier must maintain ordering and referring documentation consistent with provisions found in 42 C. F.R. 424.516(f). DMEPOS suppliers are prohibited from sharing a practice location with certain other Medicare providers and suppliers. DMEPOS suppliers must remain open to the public for a minimum of 30 hours per week with certain exceptions.

## 2023-03-07 ENCOUNTER — OFFICE VISIT (OUTPATIENT)
Dept: ORTHOPEDIC SURGERY | Age: 63
End: 2023-03-07

## 2023-03-07 VITALS — WEIGHT: 180 LBS | HEIGHT: 70 IN | BODY MASS INDEX: 25.77 KG/M2

## 2023-03-07 DIAGNOSIS — M79.671 PAIN OF RIGHT HEEL: Primary | ICD-10-CM

## 2023-03-07 RX ORDER — ROSUVASTATIN CALCIUM 40 MG/1
TABLET, COATED ORAL
COMMUNITY
Start: 2023-02-22

## 2023-03-07 RX ORDER — MAGNESIUM OXIDE 400 MG/1
240 TABLET ORAL
COMMUNITY

## 2023-03-07 RX ORDER — DIAZEPAM 5 MG/1
TABLET ORAL
COMMUNITY
Start: 2023-01-23

## 2023-03-07 RX ORDER — NITROGLYCERIN 0.4 MG/1
0.4 TABLET SUBLINGUAL EVERY 5 MIN PRN
COMMUNITY
Start: 2022-08-18

## 2023-03-07 RX ORDER — LEVOTHYROXINE SODIUM 0.07 MG/1
TABLET ORAL
COMMUNITY
Start: 2023-02-26

## 2023-03-07 RX ORDER — PREDNISONE 5 MG/1
TABLET ORAL
COMMUNITY
Start: 2023-02-28

## 2023-03-07 RX ORDER — IBUPROFEN 400 MG/1
TABLET ORAL
COMMUNITY
Start: 2022-12-29

## 2023-03-07 RX ORDER — CLOBETASOL PROPIONATE 0.46 MG/ML
SOLUTION TOPICAL
COMMUNITY
Start: 2023-02-23

## 2023-03-07 RX ORDER — ESTRADIOL 10 UG/1
TABLET VAGINAL
COMMUNITY
Start: 2023-02-11

## 2023-03-07 RX ORDER — SULFACETAMIDE SODIUM, SULFUR 100; 50 MG/G; MG/G
LOTION TOPICAL
COMMUNITY

## 2023-03-07 NOTE — PROGRESS NOTES
Name: Gillian Ordonez  YOB: 1960  Gender: female  MRN: [id number]     03/07/2023: She is better but not pain-free    HPI:   10/30/2013: Right Achilles debridement/reconstruction, FHL tendon transfer   ~ 2017 she developed left lateral foot pain.    08/25/2017: Diagnosis: Left dorsolateral foot pain, talonavicular narrowing, 3-4 tarsometatarsal area arthritis   11/15/2021: Diagnoses:   Left lateral hindfoot pain felt to be subtalar to calcaneocuboid joint driven [injected]   Left tarsometatarsal arthritis    July 2022, she noticed left second toe and forefoot pain  September 2022: Increased amount of forefoot pain and limitations  10/07/2022: She presented: Left > Right second toe changes and left forefoot pain  10/07/2022: Diagnoses:  Right mild bunion; mild tibial second claw toe  Left bunion; tibial second claw toe, metatarsal phalangeal joint synovitis, metatarsalgia  Left tarsometatarsal arthritis; hindfoot arthritis    02/27/2023: Played singles tennis which is not her normal activity.  02/28/2023: Woke up with intense severe heel pain    ROS/Meds/PSH/PMH/FH/SH: reviewed today    Tobacco:  reports that she has never smoked. She has never used smokeless tobacco.     Physical Examination:  Patient appears to be alert and oriented with acceptable appearance.  No obvious distress or SOB  CV: appears to have acceptable vascular color and capillary refill  Neuro: appears to have mostly intact light touch sensation   Skin: Resolved right heel soft tissue swelling  MS: Standing: Plantigrade: Gait much better  Right = much less heel tuberosity pain   Right = no plantar fascial tear; no Achilles tear    XR: Right: Standing AP lateral mortise ankle plus AP oblique foot taken 02/28/2023 with no acute pathology appreciated; moderate midfoot arthritis  XR Impression:  As above      Assessment:    Right acute heel pain: Suspected heel tuberosity stress fracture    Plan:   The patient and I discussed the above  assessment. We explored treatment options. She is better so I feel she has a healing calcaneal tuberosity stress fracture  She will wean the 3D boot and use her Air heel with Hoka shoes for gradual very slow return to activity  Otherwise, I recommend OOFOS shoes    Advanced medical imaging: Right ankle/hindfoot MRI scan: No indication today   PT: J CARLOS Claros  Orthotic/prosthetic: Demarco December: Custom heel PQ full-length insoles    Medication - OTC meds prn: Completed prescribed: Prednisone 5 mg Sterapred pack; take per package insert; 48; 2 refills  Surgical discussion: Future consideration for surgery but no indication today  Follow up: As needed  Work status: Sitting     This note was created using Dragon voice recognition software which may result in errors of speech and spelling recognition and word/phrase syntax errors.

## 2023-03-07 NOTE — PROGRESS NOTES
Patient had a defective Aircast Air Heel so I replaced it for a new one. It is within our policy for an exchange with no charge.

## 2023-04-06 NOTE — PROGRESS NOTES
20x  Seated heel raises 20x  Seated toe raises 20x  Standing calf stretch on book 1 minute  Piriformis stretch 2x  LTR with pec stretch 3x  Reviewed Shoulder t band exercises she had 2 years ago with [facility]    Patient Education on the condition/pathology, involved anatomy, and exercise rationale. CLINICAL DECISION MAKING/ASSESSMENT     Personal Factors/co-morbidities affecting POC (1-2 Medium/3+High): coping styles/strategies  habits/routines  past/current experiences   Problem List: (1-2 Low/ 3 Medium/ 4+ High) Pain  Localized swelling/edema  ROM limitations  Strength deficits  Decreased endurance/activity Tolerance  Restricted social activity  Restricted recreational participation  Decreased Warren with Home Exercise Program    Clinical decision making: low complexity with therapist able to easily and confidently determine and predict expectations and future outcomes for the POC. Prognosis: excellent   Benefits and precautions of treatment explained to patient. Kylie Pope is a 57 y.o. female who presents to therapy today with evolving/changing clinical presentation (moderate complexity)  related to right heel pain. Pt would benefit from skilled physical therapy services to address the deficits noted above for return to prior level of function. PLAN OF CARE     Effective Dates: 4/7/2023 TO 6/6/2023 (60 days).     Frequency/Duration: 2x/week for 60 Day(s)  Interventions may include but are not limited to: (12132) Therapeutic exercise to develop ROM, strength, endurance and flexibility  (47718) Therapeutic activities using dynamic activities to improve function  (47029) Gait training to address mechanics, proper step length and weight shifting to improve household and community mobility as well as overall safety with ADLs  (58984) Manual therapy techniques to improve joint and/or soft tissue mobility, ROM, and function as well as helping to decrease pain/spasms and swelling  Modalities prn to address

## 2023-04-07 ENCOUNTER — OFFICE VISIT (OUTPATIENT)
Age: 63
End: 2023-04-07

## 2023-04-07 DIAGNOSIS — M62.81 MUSCLE WEAKNESS: ICD-10-CM

## 2023-04-07 DIAGNOSIS — M25.571 ACUTE RIGHT ANKLE PAIN: Primary | ICD-10-CM

## 2023-04-07 DIAGNOSIS — M25.671 ANKLE STIFFNESS, RIGHT: ICD-10-CM

## 2023-04-07 DIAGNOSIS — M79.671 PAIN OF RIGHT HEEL: ICD-10-CM

## 2023-04-07 DIAGNOSIS — Z74.09 IMPAIRED MOBILITY AND ADLS: ICD-10-CM

## 2023-04-07 DIAGNOSIS — R26.2 DIFFICULTY WALKING: ICD-10-CM

## 2023-04-07 DIAGNOSIS — Z78.9 IMPAIRED MOBILITY AND ADLS: ICD-10-CM

## 2023-04-24 NOTE — PROGRESS NOTES
[facility]  [address] [address]  Dept: [phone]      Physical Therapy Daily Note     Insurance: No Authorization required ([address]) ~8 or 9 visits ($1000 limit)    Total Timed Procedure Codes: 50 min, Total Time: 60 min        Referring MD: Brooklynn العراقي MD  Referral for: right heel pain   Onset Date: 2/26/2023        Diagnosis:     ICD-10-CM    1. Acute right ankle pain  M25.571       2. Ankle stiffness, right  M25.671       3. Difficulty walking  R26.2       4. Impaired mobility and ADLs  Z74.09     Z78.9       5. Muscle weakness  M62.81            Therapy precautions:Latex allergy  Co-morbidities affecting plan of care: R Achilles tendon reconstruction in 2013, PACEMAKER, heart stent, left RC repair (2020), left foot stress fracture (10/2022). SUBJECTIVE     Patient reports she has right buttock pain today from moving furniture and driving 4 hours. She states right plantar heel is painful as well as right heel. She reports less buttock pain and less heel pain after therapy today. OBJECTIVE     Treatment provided today:  Therapeutic exercise (51573) x 20 min to develop ROM, strength, endurance and flexibility. Included:   Tennis ball self massage right plantar foot     Standing calf stretch on book 1 minute  Piriformis stretch 2x    HS stretch 1 minute      Manual therapy (03922) x 30 min utilizing techniques to improve joint and/or soft tissue mobility, ROM, and function as well as helping to decrease pain/spasms and swelling. (STM to right plantar fascia, right Achilles tendon at insertion and above heel/Right LE SI joint mobilization)    Patient Education on the condition/pathology, involved anatomy, and exercise rationale. ASSESSMENT     Patient is able to play doubles tennis with moderate pain right heel after playing.  She has tenderness today to right piriformis, right SI joint, right plantar fascia, and right Achilles tendon at

## 2023-04-25 ENCOUNTER — TREATMENT (OUTPATIENT)
Age: 63
End: 2023-04-25
Payer: COMMERCIAL

## 2023-04-25 DIAGNOSIS — M25.671 ANKLE STIFFNESS, RIGHT: ICD-10-CM

## 2023-04-25 DIAGNOSIS — M25.571 ACUTE RIGHT ANKLE PAIN: Primary | ICD-10-CM

## 2023-04-25 DIAGNOSIS — Z78.9 IMPAIRED MOBILITY AND ADLS: ICD-10-CM

## 2023-04-25 DIAGNOSIS — M62.81 MUSCLE WEAKNESS: ICD-10-CM

## 2023-04-25 DIAGNOSIS — R26.2 DIFFICULTY WALKING: ICD-10-CM

## 2023-04-25 DIAGNOSIS — Z74.09 IMPAIRED MOBILITY AND ADLS: ICD-10-CM

## 2023-04-25 PROCEDURE — 97110 THERAPEUTIC EXERCISES: CPT | Performed by: PHYSICAL THERAPIST

## 2023-04-25 PROCEDURE — 97140 MANUAL THERAPY 1/> REGIONS: CPT | Performed by: PHYSICAL THERAPIST

## 2023-05-08 NOTE — PROGRESS NOTES
[facility]  [address] [address]  Dept: [phone]      Physical Therapy Daily Note     Insurance: No Authorization required ([address]) ~8 or 9 visits ($1000 limit)  Today is visit 4    Total Timed Procedure Codes: 40 min, Total Time: 40 min        Referring MD: Yina Quintanilla MD  Referral for: right heel pain   Onset Date: 2/26/2023        Diagnosis:     ICD-10-CM    1. Acute right ankle pain  M25.571       2. Ankle stiffness, right  M25.671       3. Difficulty walking  R26.2       4. Impaired mobility and ADLs  Z74.09     Z78.9       5. Muscle weakness  M62.81            Therapy precautions:Latex allergy  Co-morbidities affecting plan of care: R Achilles tendon reconstruction in 2013, PACEMAKER, heart stent, left RC repair (2020), left foot stress fracture (10/2022). SUBJECTIVE     Patient reports she played tennis several time last week and this week with minimal heel pain. She is doing stretches. She states bilateral HS are tight. She has been stretching but not consistently. She states she had decreased pain last visit after manual therapy. OBJECTIVE     Treatment provided today:  Therapeutic exercise (01907) x 25 min to develop ROM, strength, endurance and flexibility. Included:   Tennis ball self massage right plantar foot     Standing calf stretch on book 1 minute  Piriformis stretch 2x    HS stretch 1 minute      Manual therapy (82265) x 15 min utilizing techniques to improve joint and/or soft tissue mobility, ROM, and function as well as helping to decrease pain/spasms and swelling. (STM to right plantar fascia, right Achilles tendon at insertion and above heel)    Patient Education on the condition/pathology, involved anatomy, and exercise rationale. AROM Measurements:      Ankle Right Left Comments   DF 5 -4  L lack neutral   PF 43 52     INV 30 30     EV 18 10            ASSESSMENT     Patient is able to play doubles tennis with minimal

## 2023-05-09 ENCOUNTER — TREATMENT (OUTPATIENT)
Age: 63
End: 2023-05-09
Payer: COMMERCIAL

## 2023-05-09 DIAGNOSIS — M25.571 ACUTE RIGHT ANKLE PAIN: Primary | ICD-10-CM

## 2023-05-09 DIAGNOSIS — M62.81 MUSCLE WEAKNESS: ICD-10-CM

## 2023-05-09 DIAGNOSIS — R26.2 DIFFICULTY WALKING: ICD-10-CM

## 2023-05-09 DIAGNOSIS — Z74.09 IMPAIRED MOBILITY AND ADLS: ICD-10-CM

## 2023-05-09 DIAGNOSIS — Z78.9 IMPAIRED MOBILITY AND ADLS: ICD-10-CM

## 2023-05-09 DIAGNOSIS — M25.671 ANKLE STIFFNESS, RIGHT: ICD-10-CM

## 2023-05-09 PROCEDURE — 97110 THERAPEUTIC EXERCISES: CPT | Performed by: PHYSICAL THERAPIST

## 2023-05-09 PROCEDURE — 97140 MANUAL THERAPY 1/> REGIONS: CPT | Performed by: PHYSICAL THERAPIST

## 2023-05-30 ENCOUNTER — TREATMENT (OUTPATIENT)
Age: 63
End: 2023-05-30
Payer: COMMERCIAL

## 2023-05-30 DIAGNOSIS — M25.571 ACUTE RIGHT ANKLE PAIN: Primary | ICD-10-CM

## 2023-05-30 DIAGNOSIS — R26.2 DIFFICULTY WALKING: ICD-10-CM

## 2023-05-30 DIAGNOSIS — M62.81 MUSCLE WEAKNESS: ICD-10-CM

## 2023-05-30 DIAGNOSIS — M25.671 ANKLE STIFFNESS, RIGHT: ICD-10-CM

## 2023-05-30 DIAGNOSIS — Z74.09 IMPAIRED MOBILITY AND ADLS: ICD-10-CM

## 2023-05-30 DIAGNOSIS — Z78.9 IMPAIRED MOBILITY AND ADLS: ICD-10-CM

## 2023-05-30 PROCEDURE — 97110 THERAPEUTIC EXERCISES: CPT | Performed by: PHYSICAL THERAPIST

## 2023-05-30 PROCEDURE — 97140 MANUAL THERAPY 1/> REGIONS: CPT | Performed by: PHYSICAL THERAPIST

## 2023-05-30 NOTE — PROGRESS NOTES
[facility]  [address] [address]  Dept: [phone]      Physical Therapy Progress Report     Insurance: No Authorization required ([address] [address]) ~8 or 9 visits ($1000 limit)  Today is visit 5    Total Timed Procedure Codes: 45 min, Total Time: 45 min        Referring MD: Cindy Li MD  Referral for: right heel pain   Onset Date: 2/26/2023        Diagnosis:     ICD-10-CM    1. Acute right ankle pain  M25.571       2. Ankle stiffness, right  M25.671       3. Difficulty walking  R26.2       4. Impaired mobility and ADLs  Z74.09     Z78.9       5. Muscle weakness  M62.81            Therapy precautions:Latex allergy  Co-morbidities affecting plan of care: R Achilles tendon reconstruction in 2013, PACEMAKER, heart stent, left RC repair (2020), left foot stress fracture (10/2022). SUBJECTIVE     Patient reports she played tennis today before therapy. She states the match was longer than normal and she has moderate pain lateral right foot, plantar right foot/heel and around her heel (Achilles insertion). She states she will be in [facility] for 34 days and will return to therapy after her trip. OBJECTIVE     Treatment provided today:  Therapeutic exercise (31630) x 15 min to develop ROM, strength, endurance and flexibility. Included:   Tennis ball self massage right plantar foot     Standing calf stretch on book 1 minute  Piriformis stretch 2x  LTR with pec stretch 2x    HS stretch 1 minute      Manual therapy (18479) x 30 min utilizing techniques to improve joint and/or soft tissue mobility, ROM, and function as well as helping to decrease pain/spasms and swelling. (STM to right plantar fascia, right Achilles tendon at insertion and above heel, and lateral right foot/Right LE mobilization of SI joint)    Patient Education on the condition/pathology, involved anatomy, and exercise rationale. AROM Measurements:      Ankle Right Left Comments   DF 3 -4  L lack

## 2023-07-07 ENCOUNTER — TELEPHONE (OUTPATIENT)
Age: 63
End: 2023-07-07

## 2023-10-06 ENCOUNTER — TELEPHONE (OUTPATIENT)
Dept: ORTHOPEDIC SURGERY | Age: 63
End: 2023-10-06

## 2023-10-06 ENCOUNTER — OFFICE VISIT (OUTPATIENT)
Dept: ORTHOPEDIC SURGERY | Age: 63
End: 2023-10-06

## 2023-10-06 VITALS — HEIGHT: 70 IN | BODY MASS INDEX: 25.77 KG/M2 | WEIGHT: 180 LBS

## 2023-10-06 DIAGNOSIS — M79.672 LEFT FOOT PAIN: Primary | ICD-10-CM

## 2023-10-06 DIAGNOSIS — M19.072 PRIMARY OSTEOARTHRITIS, LEFT ANKLE AND FOOT: ICD-10-CM

## 2023-10-06 RX ORDER — PREDNISONE 5 MG/1
TABLET ORAL
Qty: 1 EACH | Refills: 2 | Status: SHIPPED | OUTPATIENT
Start: 2023-10-06

## 2023-10-06 RX ORDER — METHYLPREDNISOLONE ACETATE 40 MG/ML
40 INJECTION, SUSPENSION INTRA-ARTICULAR; INTRALESIONAL; INTRAMUSCULAR; SOFT TISSUE ONCE
Status: COMPLETED | OUTPATIENT
Start: 2023-10-06 | End: 2023-10-06

## 2023-10-06 RX ORDER — LIDOCAINE 5% 5 G/100G
CREAM TOPICAL
Qty: 45 G | Refills: 2 | Status: SHIPPED | OUTPATIENT
Start: 2023-10-06

## 2023-10-06 RX ADMIN — METHYLPREDNISOLONE ACETATE 40 MG: 40 INJECTION, SUSPENSION INTRA-ARTICULAR; INTRALESIONAL; INTRAMUSCULAR; SOFT TISSUE at 15:19

## 2023-10-06 NOTE — PROGRESS NOTES
Name: Deni Bates  YOB: 1960  Gender: female  MRN: [id number]     CC: Left foot pain    HPI:   10/30/2013: Right Achilles reconstruction: FHL transfer  08/25/2017: Left dorsolateral foot pain, talonavicular narrowing, 3-4 TMT arthritis  11/15/2021: Left lateral hindfoot arthritis/pain: TMT arthritis  10/07/2022: Right bunion, tibial second claw toe: Left bunion, tibial second claw toe MTP synovitis: TMT and hindfoot arthritis  03/07/2023: Diagnosis: Right acute heel pain suspected tuberosity fracture  Mid September 2023: Left dorsal lateral foot pain: No specific trauma  10/06/2023: Initial visit: Left dorsal lateral foot pain    ROS/Meds/PSH/PMH/FH/SH: reviewed today    Tobacco:  reports that she has never smoked. She has never used smokeless tobacco.   Pacemaker    Physical Examination:  Patient appears to be alert and oriented with acceptable appearance. No obvious distress or SOB  CV: appears to have acceptable vascular color and capillary refill  Neuro: appears to have mostly intact light touch sensation   Skin: Left dorsal lateral midfoot thickening  MS: Standing: Plantigrade: Bunion, 2-3 tibial claw toes  Left = fourth TMT dorsal lateral foot pain  Left = no bunion or 1-2 toe pain  Left = no ankle or hindfoot pain  Left = good ankle/foot motion; 5/5 strength; no instability or crepitance    XR: Left: Standing AP lateral mortise ankle plus AP oblique foot taken today with plantar heel enthesopathy; bunion; tibial 2-3 claw toe; tarsometatarsal arthritis  XR Impression:  As above       Injection: We discussed risks/complications of injection and decided proceed: After sterile prep: Left dorsal lateral 4th tarsometatarsal joint injected 1 cc Xylocaine, 40 mg Depo-Medrol; she did well     Assessment:    Left bunion, tibial 2-3 claw toes  Left 2-4 tarsometatarsal arthritis  Left mild hindfoot arthritis    Plan:   The patient and I discussed the above assessment. We explored treatment options.

## 2023-10-06 NOTE — TELEPHONE ENCOUNTER
Patient left a voice mail on apt line that if any cancellations today can come anytime apt is for today 3:05

## 2023-10-17 ENCOUNTER — CLINICAL DOCUMENTATION (OUTPATIENT)
Age: 63
End: 2023-10-17

## 2024-03-06 ENCOUNTER — TELEPHONE (OUTPATIENT)
Dept: ORTHOPEDIC SURGERY | Age: 64
End: 2024-03-06

## 2024-03-06 NOTE — TELEPHONE ENCOUNTER
----- Message from Gillian Ordonez sent at 3/6/2024  2:51 PM EST -----  Regarding: Gillian Ordonez question  Contact: [phone]  Weston Morse,  At my October visit you gave me orders for ?? to get additional inserts made. My mom has been in great decline since then and passed away last week. It's been a tough six months!    I am now turning myself towards things I have put off. I cannot find those orders anywhere? Is it possible you all can send them to me via [employer]? Or at least provide a phone # /location for .   Thanks  Gillian

## 2024-03-06 NOTE — TELEPHONE ENCOUNTER
Called and spoke to pt , order was fax to [facility] pt was given Julio Cesarers # to call and make an apt.

## 2024-03-16 ENCOUNTER — TRANSCRIBE ORDERS (OUTPATIENT)
Dept: SCHEDULING | Age: 64
End: 2024-03-16

## 2024-03-16 DIAGNOSIS — Z12.31 VISIT FOR SCREENING MAMMOGRAM: Primary | ICD-10-CM

## (undated) DEVICE — RESTRAINT UNIVERSAL HEAD DISP --

## (undated) DEVICE — 90-S MAX, SUCTION PROBE, NON-BENDABLE, MAX CUT LEVEL 11: Brand: SERFAS ENERGY

## (undated) DEVICE — PREP SKN CHLRAPRP APL 26ML STR --

## (undated) DEVICE — 3M™ COBAN™ SELF-ADHERENT WRAP, 1586S, STERILE, 6 IN X 5 YD (15 CM X 4,5 M), 12 ROLLS/CASE: Brand: 3M™ COBAN™

## (undated) DEVICE — STOCKINETTE ORTH W9XL36IN COT 2 PLY HLLW FOR HANDLING LMB

## (undated) DEVICE — SHLDR ARTHRO PAYLOR,ROBERSON: Brand: MEDLINE INDUSTRIES, INC.

## (undated) DEVICE — SOFT SILICONE HYDROCELLULAR FOAM DRESSING WITH LOCK AWAY LAYER: Brand: ALLEVYN LIFE XL 21X21 CTN10

## (undated) DEVICE — SUT ETHLN 3-0 18IN PS1 BLK --

## (undated) DEVICE — 4-PORT MANIFOLD: Brand: NEPTUNE 2

## (undated) DEVICE — [AUGER BUR, ARTHROSCOPIC SHAVER BLADE,  DO NOT RESTERILIZE,  DO NOT USE IF PACKAGE IS DAMAGED,  KEEP DRY,  KEEP AWAY FROM SUNLIGHT]: Brand: FORMULA

## (undated) DEVICE — SLING ARM XL L20.25IN D9IN COT POLY BILAT WEB SHLDR STRP

## (undated) DEVICE — AMD ANTIMICROBIAL GAUZE SPONGES,12 PLY USP TYPE VII, 0.2% POLYHEXAMETHYLENE BIGUANIDE HCI (PHMB): Brand: CURITY

## (undated) DEVICE — SOLUTION IRRIG 3000ML 0.9% SOD CHL FLX CONT 0797208] ICU MEDICAL INC]

## (undated) DEVICE — DRAPE,SHOULDER,BEACH CHAIR,STERILE: Brand: MEDLINE

## (undated) DEVICE — NDL SPNE QNCKE 18GX3.5IN LF --

## (undated) DEVICE — OUTFLOW CASSETTE TUBING, DO NOT USE IF PACKAGE IS DAMAGED: Brand: CROSSFLOW

## (undated) DEVICE — BLADE SHV DIA4MM RED RESECT FOR GEN DEB REM OF PERIOST FR

## (undated) DEVICE — GARMENT,MEDLINE,DVT,INT,CALF,MED, GEN2: Brand: MEDLINE

## (undated) DEVICE — INFLOW CASSETTE TUBING, DO NOT USE IF PACKAGE IS DAMAGED: Brand: CROSSFLOW